# Patient Record
Sex: FEMALE | Race: WHITE | Employment: UNEMPLOYED | URBAN - METROPOLITAN AREA
[De-identification: names, ages, dates, MRNs, and addresses within clinical notes are randomized per-mention and may not be internally consistent; named-entity substitution may affect disease eponyms.]

---
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
## 2023-01-25 ENCOUNTER — OFFICE VISIT (OUTPATIENT)
Dept: ORTHOPEDIC SURGERY | Age: 18
End: 2023-01-25
Payer: COMMERCIAL

## 2023-01-25 DIAGNOSIS — G57.51 TTS (TARSAL TUNNEL SYNDROME), RIGHT: Primary | ICD-10-CM

## 2023-01-25 PROCEDURE — 99213 OFFICE O/P EST LOW 20 MIN: CPT | Performed by: ORTHOPAEDIC SURGERY

## 2023-01-25 NOTE — PROGRESS NOTES
Name: Mary Jo Mahmood  YOB: 2005  Gender: female  MRN: [id number]    Summary:   Right resolving tarsal tunnel syndrome       CC: Foot Pain (Right foot f/u)       HPI: Mary Jo Mahmood is a 21 y.o. female who presents with Foot Pain (Right foot f/u)  . Patient presents back to the office a for follow-up of right tarsal tunnel syndrome. She is been treated in a walker boot. History was obtained by Patient     ROS/Meds/PSH/PMH/FH/SH: I personally reviewed the patients standard intake form. Below are the pertinents    Tobacco:  reports that she has never smoked. She has never used smokeless tobacco.  Diabetes: None      Physical Examination:    Exam of the right foot and ankle shows significant less swelling and tenderness palpation of the plantar fashion over the tarsal tunnel. The Tinel's over the tarsal tunnel is substantially better. Imaging:   No imaging reviewed           Tere Monroe III, MD           Assessment:   Resolving Planter fasciitis with tarsal tunnel syndrome    Treatment Plan:   3 This is stable chronic illness/condition  Treatment at this time: Physical Therapy  Studies ordered: NO XR needed @ Next Visit    Weight-bearing status: WBAT        Return to work/work restrictions: none  No medications given    She can wean out of her walker boot into comfortable and supportive shoes as we discussed today. We provided a home exercise program for her to follow if she can while at college. She could use the boot again if she has a bad day. She is can return as needed.

## 2023-04-24 ENCOUNTER — OFFICE VISIT (OUTPATIENT)
Dept: ORTHOPEDIC SURGERY | Age: 18
End: 2023-04-24
Payer: COMMERCIAL

## 2023-04-24 DIAGNOSIS — M25.859 FEMORAL ACETABULAR IMPINGEMENT: Primary | ICD-10-CM

## 2023-04-24 DIAGNOSIS — M25.552 BILATERAL HIP PAIN: ICD-10-CM

## 2023-04-24 DIAGNOSIS — M25.551 BILATERAL HIP PAIN: ICD-10-CM

## 2023-04-24 PROCEDURE — 99204 OFFICE O/P NEW MOD 45 MIN: CPT | Performed by: PHYSICIAN ASSISTANT

## 2023-04-24 RX ORDER — PREDNISONE 10 MG/1
TABLET ORAL
Qty: 21 EACH | Refills: 0 | Status: SHIPPED | OUTPATIENT
Start: 2023-04-24

## 2023-04-25 ENCOUNTER — TELEPHONE (OUTPATIENT)
Dept: ORTHOPEDIC SURGERY | Age: 18
End: 2023-04-25

## 2023-04-27 PROBLEM — J30.1 ALLERGIC REACTION TO TREE POLLEN: Status: ACTIVE | Noted: 2022-01-04

## 2023-04-27 PROBLEM — F41.9 ANXIETY: Status: ACTIVE | Noted: 2017-08-24

## 2023-04-27 PROBLEM — M25.571 RIGHT ANKLE PAIN: Status: ACTIVE | Noted: 2022-11-15

## 2023-04-28 NOTE — PROGRESS NOTES
Name: Kendra Rea  YOB: 2005  Gender: female  MRN: [id number]    CC:   Chief Complaint   Patient presents with    Pain     Right hip pain   , Right hip pain. HPI: Patient presents for bilateral right hip pain. Patient states that she was in a walking boot in January and began having hip pain. Notes pain worse with external rotation. Notes some groin pain as well as a pain into the lumbar spine. Does note some lateral hip popping. Does note occasional bilateral pain but right is more symptomatic. She states that she was in a dance club. She denies numbness, tingling, interference with sleep, prior surgical intervention. She does take occasional NSAID  No other complaints. No other issues. 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 Structurally normal heart. Normal LV size and systolic function. LVD 37 mm. LV FS 38%. EF 68%. Hypotonia     Mandibular hypoplasia     Neuronal intestinal dysplasia     followed by Dr Daisy Stephenson ([address] ped surgery)    PFO (patent foramen ovale)     per ped cardio office note 2/20/18: \"Structurally normal heart. No residual PDA and PFO. 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 There has been some situations that have been suggestive of syncope or orthostatic Intolerance. \"     Past Surgical History:   Procedure Laterality Date    GI  04/19/2010    anal exam under anesthesia, fecal disimpaction, rectal

## (undated) DEVICE — TRAY PREP DRY W/ PREM GLV 2 APPL 6 SPNG 2 UNDPD 1 OVERWRAP

## (undated) DEVICE — BUTTON SWITCH PENCIL BLADE ELECTRODE, HOLSTER: Brand: EDGE

## (undated) DEVICE — TIP CLEANR ELECSURG 1.75X1.75 --

## (undated) DEVICE — DRAPE TWL SURG 16X26IN BLU ORB04] ALLCARE INC]

## (undated) DEVICE — HEAD AND NECK: Brand: MEDLINE INDUSTRIES, INC.

## (undated) DEVICE — JAW BRA SURG TMJ VELC CLSR SYS V CUT SUPP STRP 2 ZIPLOK BG

## (undated) DEVICE — STRIP,CLOSURE,WOUND,MEDI-STRIP,1/2X4: Brand: MEDLINE

## (undated) DEVICE — KENDALL SCD EXPRESS SLEEVES, KNEE LENGTH, MEDIUM: Brand: KENDALL SCD

## (undated) DEVICE — X-RAY SPONGES,12 PLY: Brand: DERMACEA

## (undated) DEVICE — MAGNETIC DRAPE: Brand: DEVON

## (undated) DEVICE — SUTURE PERMA HND SZ 2 0 L18IN NONABSORBABLE BLK L19MM PS 2 583H

## (undated) DEVICE — GOWN,REINF,POLY,ECL,PP SLV,XL: Brand: MEDLINE

## (undated) DEVICE — 4.0MM EGG BUR

## (undated) DEVICE — SUTURE COAT VCRL SZ 4-0 L18IN ABSRB UD L19MM PS-2 1/2 CIR J496G

## (undated) DEVICE — 2000CC GUARDIAN II: Brand: GUARDIAN

## (undated) DEVICE — GOWN,AURORA,FABRIC-REINFORCED,2XL: Brand: MEDLINE

## (undated) DEVICE — DRAPE SHT 3 QTR PROXIMA 53X77 --

## (undated) DEVICE — GOWN,REINFORCED,POLY,AURORA,XXLARGE,STR: Brand: MEDLINE

## (undated) DEVICE — SYR 50ML LR LCK 1ML GRAD NSAF --

## (undated) DEVICE — MASTISOL ADHESIVE LIQ 2/3ML

## (undated) DEVICE — PRECISION THIN (22.5 X 0.38MM)

## (undated) DEVICE — BIT DRL L115MM DIA1.5MM RED S STL TWST J NOTCH W/O STP

## (undated) DEVICE — REM POLYHESIVE ADULT PATIENT RETURN ELECTRODE: Brand: VALLEYLAB

## (undated) DEVICE — SUT PROL 5-0 18IN P3 BLU --

## (undated) DEVICE — SUT CHRMC 3-0 27IN SH BRN --

## (undated) DEVICE — 3.0MM DIAMOND ROUND BUR

## (undated) DEVICE — E-Z CLEAN, NON-STICK, PTFE COATED, MEGA FINE ELECTROSURGICAL NEEDLE ELECTRODE, SHARP, 2.5 INCH (6.3 CM): Brand: MEGADYNE

## (undated) DEVICE — SOLUTION IV 1000ML 0.9% SOD CHL

## (undated) DEVICE — (D)STRIP SKN CLSR 0.5X4IN WHT --